# Patient Record
Sex: FEMALE | Race: WHITE | NOT HISPANIC OR LATINO | URBAN - METROPOLITAN AREA
[De-identification: names, ages, dates, MRNs, and addresses within clinical notes are randomized per-mention and may not be internally consistent; named-entity substitution may affect disease eponyms.]

---

## 2023-06-01 ENCOUNTER — HOSPITAL ENCOUNTER (OUTPATIENT)
Dept: DATA CONVERSION | Facility: HOSPITAL | Age: 28
End: 2023-06-01
Attending: ORTHOPAEDIC SURGERY | Admitting: ORTHOPAEDIC SURGERY
Payer: COMMERCIAL

## 2023-06-01 DIAGNOSIS — S93.492S SPRAIN OF OTHER LIGAMENT OF LEFT ANKLE, SEQUELA: ICD-10-CM

## 2023-06-01 DIAGNOSIS — M25.872 OTHER SPECIFIED JOINT DISORDERS, LEFT ANKLE AND FOOT: ICD-10-CM

## 2023-06-01 DIAGNOSIS — Z79.82 LONG TERM (CURRENT) USE OF ASPIRIN: ICD-10-CM

## 2023-06-01 DIAGNOSIS — Z88.0 ALLERGY STATUS TO PENICILLIN: ICD-10-CM

## 2023-06-01 DIAGNOSIS — Q68.8 OTHER SPECIFIED CONGENITAL MUSCULOSKELETAL DEFORMITIES: ICD-10-CM

## 2023-06-01 DIAGNOSIS — M77.32 CALCANEAL SPUR, LEFT FOOT: ICD-10-CM

## 2023-06-01 DIAGNOSIS — M94.272 CHONDROMALACIA, LEFT ANKLE AND JOINTS OF LEFT FOOT: ICD-10-CM

## 2023-09-07 VITALS
HEART RATE: 57 BPM | RESPIRATION RATE: 16 BRPM | SYSTOLIC BLOOD PRESSURE: 122 MMHG | TEMPERATURE: 97.3 F | DIASTOLIC BLOOD PRESSURE: 84 MMHG

## 2023-09-30 NOTE — H&P
History of Present Illness:   Pregnant/Lactating:  ·  Are You Pregnant no   ·  Are You Currently Breastfeeding no     History Present Illness:  Reason for surgery: left ankle os trigonum syndrome,  ankle sprain   HPI:    Patient is a 27 year old female here for left subtalar joint/hindfoot endoscopy with extensive debridement, os trigonum excision, ankle arthroscopy with extensive  debridement, open lateral ligament repair with augmented brostrom-squires procedure. Patient report she is doing well. No complaints. No hx of dvt, nonsmoker, no birth control.     Allergies:        Allergies:  ·  penicillin : Rash    Home Medication Review:   Home Medications Reviewed: yes     Impression/Procedure:   ·  Impression and Planned Procedure: left subtalar joint/hindfoot endoscopy with extensive debridement, os trigonum excision, ankle arthroscopy with extensive debridement, open lateral ligament repair  with augmented brostrom-squires procedure.       ERAS (Enhanced Recovery After Surgery):  ·  ERAS Patient: no     Review of Systems:   Review of Systems:  Musculoskeletal: POSITIVE: Decreased ROM, Pain, Swelling, Stiffness, Weakness     All Other Systems: All other systems reviewed and  are negative       Vital Signs:  Temperature C: 36.3 degrees C   Temperature F: 97.3 degrees F   Heart Rate: 57 beats per minute   Respiratory Rate: 16 breath per minute   Blood Pressure Systolic: 122 mm/Hg   Blood Pressure Diastolic: 84 mm/Hg     Physical Exam by System:    Constitutional: Well developed, awake/alert/oriented  x3, no distress, alert and cooperative   Eyes: clear sclera   ENMT: mucous membranes moist, no apparent injury,  no lesions seen   Head/Neck: Neck supple, no apparent injury, trachea  midline,   Respiratory/Thorax: Patent airways, normal breath  sounds   Cardiovascular: equal pulses of the extremities   Gastrointestinal: Nondistended, soft, non-tender   Musculoskeletal: wiggles toes/fingers   Neurological: alert and  oriented x3   Psychological: Appropriate mood and behavior   Skin: Warm and dry, no lesions, no rashes     Consent:   COVID-19 Consent:  ·  COVID-19 Risk Consent Surgeon has reviewed key risks related to the risk of hermila COVID-19 and if they contract COVID-19 what the risks are.       Electronic Signatures:  Terence Tripp (PA (PHYSICIAN))  (Signed 01-Jun-2023 09:04)   Authored: History of Present Illness, Allergies, Home  Medication Review, Impression/Procedure, ERAS, Review of Systems, Physical Exam, Consent, Note Completion      Last Updated: 01-Jun-2023 09:04 by Terence Tripp (PA (PHYSICIAN))

## 2023-10-02 NOTE — OP NOTE
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  Left hindfoot/subtalar joint scope with excision steida process talus,  2. Left ankle scope with extensive debridement of tibiotalar spurs  3. Left ankle collateral ligament ATFL repair Brostrum-Mortensen augmented    Anesthesia: Fern Dior  Estimated Blood Loss: Per anesthesia  Findings: Intraoperative findings consistent with clinical and radiographic findings.   Specimens(s) Collected: no,     Complications: None  Tourniquet Times: Per anesthesia  Patient Returned To/Condition: returned to post operative recovery area with all vital signs stable and intact. Normal capillary perfusion noted to most distal aspect of surgical extremity.                                 Attestation:   Note Completion:  Attending Attestation I was present for the entire procedure    I am a: Resident/Fellow         Electronic Signatures:  Kevin Kate (DPM (Resident))  (Signed 01-Jun-2023 15:27)   Authored: Post-Operative Note, Chart Review, Note Completion  Lise Davis)  (Signed 01-Jun-2023 17:08)   Authored: Post-Operative Note, Chart Review, Note Completion   Co-Signer: Post-Operative Note, Chart Review, Note Completion      Last Updated: 01-Jun-2023 17:08 by Lise Davis)

## 2023-10-10 ENCOUNTER — TREATMENT (OUTPATIENT)
Dept: PHYSICAL THERAPY | Facility: CLINIC | Age: 28
End: 2023-10-10
Payer: COMMERCIAL

## 2023-10-10 DIAGNOSIS — G89.29 CHRONIC PAIN OF LEFT ANKLE: Primary | ICD-10-CM

## 2023-10-10 DIAGNOSIS — M25.672 STIFFNESS OF ANKLE JOINT, LEFT: ICD-10-CM

## 2023-10-10 DIAGNOSIS — R29.898 ANKLE WEAKNESS: ICD-10-CM

## 2023-10-10 DIAGNOSIS — M25.572 CHRONIC PAIN OF LEFT ANKLE: Primary | ICD-10-CM

## 2023-10-10 DIAGNOSIS — M25.472 ANKLE EFFUSION, LEFT: ICD-10-CM

## 2023-10-10 PROCEDURE — 97140 MANUAL THERAPY 1/> REGIONS: CPT | Mod: GP | Performed by: PHYSICAL THERAPIST

## 2023-10-10 PROCEDURE — 97110 THERAPEUTIC EXERCISES: CPT | Mod: GP | Performed by: PHYSICAL THERAPIST

## 2023-10-10 NOTE — PROGRESS NOTES
Physical Therapy  Physical Therapy Treatment Note    Patient Name: No Young  MRN: [id number]  Today's Date: 10/10/2023  Time Calculation  Start Time: 1130  Stop Time: 1215  Time Calculation (min): 45 min    Insurance:  Visit number: 20 of 30    General:  Reason for visit: rehab following L ankle scope  Referred by: Dr Davis    Assessment:     Patient presents today after returning from several week trip to [place] with ankle symptom aggravation and decrease strength about the left ankle with testing today.  She demonstrates a loss of her ankle eversion strength with concordant symptoms, as well as general hypermobility of the left ankle.  We issued some ankle eversion strengthening exercises as well as single-leg stance and a gradual resumption of her rehab and strengthening exercises including running while wearing her brace once her symptoms have subsided, which seem to have been aggravated by increased time in weightbearing and walking during her travel.  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  She would continue to benefit from skilled physical therapy for increasing strength, progression of running on land, as well as progressing plyometrics per patient tolerance.   Response to treatment: decreased pain, improved joint mobility/ROM, improved strength, improved endurance, improved tissue mobility and improved knowledge and understanding of condition.       Plan:    Patient to don a ankle brace while ambulating or standing for longer periods of time, and especially with exercise and running to provide increased stability around the ankle while he is acute symptoms of pain and inflammation are resolving.  We discussed gradually increasing activity since she is decreased her overall activity level over the last couple weeks.  We will gradually ramp back up into running strength  and change direction once symptoms have resolved.  Continue to progress with running, basic change of direction, motor control, plyometrics, and strength training.    Progress with POC, as tolerated.       Current Problem  1. Chronic pain of left ankle        2. Ankle weakness        3. Ankle effusion, left        4. Stiffness of ankle joint, left            Precautions: none      Subjective:     Patient reports:  Patient returns following several week trip to [place] where she was traveling throughout [place].  She notes did a lot of walking every day as well as traveling on the plane and buses throughout the country.  As the trip went on she became more sore with weightbearing and ambulation in the ankle.  Was not able to do her normal exercise progression while traveling abroad.  Notes with resuming her normal progression of exercises including strengthening and running has had increase in discomfort especially noted in the subtalar region posteriorly.  Notes increased soreness with starting exercise and following though seems to be better tolerated with performance after an active warm up.  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  Reports did several sets of jogging on the court today with symptoms at initiation and then with walking over to the clinic following.    Pain  4/10     Performing HEP?: Yes      Objective:     Ortho   Incision mobility in normal limits  Concordant symptoms noted with subtalar joint mobilization in an anterior posterior direction as well as with medial or lateral mobilization, to a lesser degree with medial calcaneal tilting, and more so with resisted ankle eversion on the left, which demonstrates weakness today  Ambulating without antalgia or asymmetry in athletic shoe  Demonstrates normal to hypermobility at the subtalar and talocrural joint as well as with calcaneal tilting on the  "left    Strength   Foot and Ankle:   (Key: \" P! \" Denotes Pain with Movement)   Foot and ankle strength was normal on the right side.   Foot dorsiflexion was 5/5 on left.   Foot plantar flexion was 5/5 on left.   Ankle eversion was 4-/5 on left with concordant symptoms reproduced  Ankle inversion was 4+/5 on left.       Treatment Performed:    Therapeutic Exercise:    30 min  Objective test and measures as described above  Green T band ankle eversion strengthening 3 sets to failure  Single-leg stance on left eyes open with perturbation, eyes closed, then eyes closed with perturbation  Single-leg heel raises 3 sets to failure on left  Discussion of home exercise program and training progression including use of brace with ambulation and longer periods of standing, and especially with strength and running training at this time    Manual Therapy:    15 min  Gentle incision mobility  General foot and ankle passive range of motion mobilization  Talocrural, subtalar, calcaneal tilting grades 3-4 - in an anterior to posterior and posterior to anterior as well as medial and lateral direction for pain management    Ross Vega, PT   "

## 2023-10-17 ENCOUNTER — TREATMENT (OUTPATIENT)
Dept: PHYSICAL THERAPY | Facility: CLINIC | Age: 28
End: 2023-10-17
Payer: COMMERCIAL

## 2023-10-17 DIAGNOSIS — G89.29 CHRONIC PAIN OF LEFT ANKLE: ICD-10-CM

## 2023-10-17 DIAGNOSIS — M25.572 CHRONIC PAIN OF LEFT ANKLE: ICD-10-CM

## 2023-10-17 DIAGNOSIS — M25.472 ANKLE EFFUSION, LEFT: ICD-10-CM

## 2023-10-17 DIAGNOSIS — R29.898 ANKLE WEAKNESS: Primary | ICD-10-CM

## 2023-10-17 DIAGNOSIS — M25.672 STIFFNESS OF ANKLE JOINT, LEFT: ICD-10-CM

## 2023-10-17 PROCEDURE — 97110 THERAPEUTIC EXERCISES: CPT | Mod: GP | Performed by: PHYSICAL THERAPIST

## 2023-10-17 NOTE — PROGRESS NOTES
Physical Therapy  Physical Therapy Treatment Note    Patient Name: No Young  MRN: [id number]  Today's Date: 10/17/2023  Time Calculation  Start Time: 1100  Stop Time: 1145  Time Calculation (min): 45 min    Insurance:  Visit number: 21 of 30    General:  Reason for visit: rehab following L ankle scope  Referred by: Dr Davis    Assessment:     Patient doing much better today than 1 week ago.  She is having considerable less pain with her activity and exercise including running lifting and shooting.  Overall her exercises are going much better.  I was very pleased with her increase in strength activity tolerance, as well as performance of active warm up drill.  She would continue to benefit from skilled physical therapy for increasing strength, progression of running on land, as well as progressing plyometrics per patient tolerance.   Response to treatment: decreased pain, improved joint mobility/ROM, improved strength, improved endurance, improved tissue mobility and improved knowledge and understanding of condition.       Plan:    Patient to don a ankle brace while ambulating or standing for longer periods of time, and especially with exercise and running to provide increased stability around the ankle while he is acute symptoms of pain and inflammation are resolving.  We discussed continue to gradually increase activity since she is decreased her overall activity level over the last couple weeks.  We will gradually ramp back up into running strength and change direction once symptoms have resolved.  Continue to progress with running, basic change of direction, motor control, plyometrics, and strength training.    Progress with POC, as tolerated.       Current Problem  1. Ankle weakness        2. Chronic pain of left ankle        3. Stiffness of ankle joint, left        4. Ankle effusion, left            Precautions: none      Subjective:     Patient reports:  Patient returns noting she is feeling much  "better than a week ago.  She has been practicing her ankle strengthening exercises more regularly, and has resumed her regular training program gradually.  She notes there is not much of a difference at this point with performing activity in or out of the brace.  Overall much less pinching and discomfort with her ADLs and exercise, as well as running and shooting basketball.    Pain  1/10     Performing HEP?: Yes      Objective:     Ortho   Incision mobility in normal limits  11cm FDF bilat  Ambulating without antalgia or asymmetry in athletic shoe  No symptoms with general foot or ankle mobilization today  Performs active warm up on court as well as lateral motions and plyometrics including bounding and skater jumps laterally with appropriate control symmetry and without pain      Strength   Foot and Ankle:   (Key: \" P! \" Denotes Pain with Movement)   Foot and ankle strength was normal on the right side.   Foot dorsiflexion was 5/5 on left.   Foot plantar flexion was 5/5 on left.   Ankle eversion was 4/5 on left with concordant symptoms reproduced  Ankle inversion was 5/5 on left.       Treatment Performed:    Therapeutic Exercise:    45 min  Objective test and measures as described above  Bike x5 minutes  Prolonged dorsiflexion stretching on slant board times several minutes  Functional dorsiflexion on wall x30 each  Blue T band ankle eversion strengthening 3 sets to failure  Single-leg stance with toss and catch with trampoline 4 pound ball anterior right and left laterally x2 each  Eccentric resistance into dorsiflexion as well as ankle eversion 2 sets of 15 each  [place] board taps anterior posterior, medial lateral, clockwise counterclockwise x10 each  With weightbearing on left lower extremity on edge of wood board to resist ankle inversion moment, performs slide board anterior posterior, medial lateral, and an arc x10 each  Active warm up on court down back at 30 yards distance jogging anterior, running " backwards, shuffle, skipping, karaoke, skipping for height, bounding, zigzag bounding, high knees, butt kicks, lateral a skip, skater hops  Discussion of home exercise program and training progression including use of brace with ambulation and longer periods of standing, and especially with strength and running training at this time      Ross Vega, PT

## 2023-10-18 ENCOUNTER — APPOINTMENT (OUTPATIENT)
Dept: PHYSICAL THERAPY | Facility: CLINIC | Age: 28
End: 2023-10-18
Payer: COMMERCIAL

## 2023-10-21 PROBLEM — S93.492S: Status: ACTIVE | Noted: 2023-10-21

## 2023-10-21 PROBLEM — M25.872 IMPINGEMENT OF LEFT ANKLE JOINT: Status: ACTIVE | Noted: 2023-10-21

## 2023-10-21 PROBLEM — K12.0 RECURRENT APHTHOUS ULCER: Status: ACTIVE | Noted: 2020-08-25

## 2023-10-21 PROBLEM — Q68.8 OS TRIGONUM SYNDROME: Status: ACTIVE | Noted: 2023-10-21

## 2023-10-21 RX ORDER — OXYMETAZOLINE HCL 0.05 %
2 SPRAY, NON-AEROSOL (ML) NASAL 2 TIMES DAILY
COMMUNITY
Start: 2021-06-10

## 2023-10-21 RX ORDER — OXYCODONE AND ACETAMINOPHEN 5; 325 MG/1; MG/1
TABLET ORAL
COMMUNITY
Start: 2023-06-01

## 2023-10-21 RX ORDER — AZITHROMYCIN 250 MG/1
TABLET, FILM COATED ORAL
COMMUNITY
Start: 2023-09-05

## 2023-10-21 RX ORDER — DOCUSATE SODIUM 100 MG/1
100 CAPSULE, LIQUID FILLED ORAL
COMMUNITY
Start: 2023-06-01

## 2023-10-21 RX ORDER — ONDANSETRON 4 MG/1
TABLET, FILM COATED ORAL
COMMUNITY
Start: 2023-06-01

## 2023-10-21 RX ORDER — ASPIRIN 325 MG
325 TABLET, DELAYED RELEASE (ENTERIC COATED) ORAL
COMMUNITY
Start: 2023-06-02

## 2023-10-24 ENCOUNTER — TREATMENT (OUTPATIENT)
Dept: PHYSICAL THERAPY | Facility: CLINIC | Age: 28
End: 2023-10-24
Payer: COMMERCIAL

## 2023-10-24 ENCOUNTER — OFFICE VISIT (OUTPATIENT)
Dept: ORTHOPEDIC SURGERY | Facility: CLINIC | Age: 28
End: 2023-10-24
Payer: COMMERCIAL

## 2023-10-24 DIAGNOSIS — M25.472 ANKLE EFFUSION, LEFT: ICD-10-CM

## 2023-10-24 DIAGNOSIS — M25.572 CHRONIC PAIN OF LEFT ANKLE: ICD-10-CM

## 2023-10-24 DIAGNOSIS — M25.872 IMPINGEMENT OF LEFT ANKLE JOINT: ICD-10-CM

## 2023-10-24 DIAGNOSIS — S93.492S SPRAIN OF ANTERIOR TALOFIBULAR LIGAMENT, LEFT, SEQUELA: ICD-10-CM

## 2023-10-24 DIAGNOSIS — M25.672 STIFFNESS OF ANKLE JOINT, LEFT: ICD-10-CM

## 2023-10-24 DIAGNOSIS — G89.29 CHRONIC PAIN OF LEFT ANKLE: ICD-10-CM

## 2023-10-24 DIAGNOSIS — R29.898 ANKLE WEAKNESS: Primary | ICD-10-CM

## 2023-10-24 DIAGNOSIS — Q68.8 OS TRIGONUM SYNDROME: Primary | ICD-10-CM

## 2023-10-24 PROCEDURE — 99213 OFFICE O/P EST LOW 20 MIN: CPT | Performed by: ORTHOPAEDIC SURGERY

## 2023-10-24 PROCEDURE — 97110 THERAPEUTIC EXERCISES: CPT | Mod: GP | Performed by: PHYSICAL THERAPIST

## 2023-10-24 NOTE — PROGRESS NOTES
Physical Therapy  Physical Therapy Treatment Note    Patient Name: No Young  MRN: [id number]  Today's Date: 10/24/2023  Time Calculation  Start Time: 1100  Stop Time: 1145  Time Calculation (min): 45 min    Insurance:  Visit number: 22 of 30    General:  Reason for visit: rehab following L ankle scope  Referred by: Dr Davis    Assessment:     Patient continues to demonstrate progress with increased ankle stability and decreased pain.  She is still having intermittent discomfort though it continues to improve.  At this point we really need to progress with plyometrics, change direction, and progressive basketball type activities in addition to general strengthening at the ankle.  She had a follow-up visit with Dr. Davis who continues to endorse this as well.  She would continue to benefit from skilled physical therapy for increasing strength, progression of running on land, as well as progressing plyometrics per patient tolerance.   Response to treatment: decreased pain, improved joint mobility/ROM, improved strength, improved endurance, improved tissue mobility and improved knowledge and understanding of condition.       Plan:    Patient will continue to implement plyometrics 2 times a week as well as change of direction and basketball specific drills.  She will work on her strength and single-leg balance and stability.  At this point we will plan to follow-up again in 2 weeks.  Continue to progress with running, basic change of direction, motor control, plyometrics, and strength training.    Progress with POC, as tolerated.       Current Problem  1. Ankle weakness        2. Chronic pain of left ankle        3. Stiffness of ankle joint, left        4. Ankle effusion, left            Precautions: none      Subjective:     Patient reports:  Patient returns noting she is feeling much better than a week ago.  She has been practicing her ankle strengthening exercises more regularly, and has resumed her  "regular training program gradually.  She reports with increased basketball activity and change direction that she has less symptoms while wearing her brace.  Overall much less pinching and discomfort with her ADLs and exercise, as well as running and shooting basketball.    Pain  1/10     Performing HEP?: Yes      Objective:     Ortho   Incision mobility in normal limits  11cm FDF bilat  Ambulating without antalgia or asymmetry in athletic shoe  No symptoms with general foot or ankle mobilization today  With single-leg stance and proprioception exercises starts to demonstrate increased fatigue and achiness with performance on the lateral left ankle.  Able to perform lateral medial hopping on the left ankle with appropriate control and without pain.      Strength   Foot and Ankle:   (Key: \" P! \" Denotes Pain with Movement)   Foot and ankle strength was normal on the right side.   Foot dorsiflexion was 5/5 on left.   Foot plantar flexion was 5/5 on left.   Ankle eversion was 4+/5 on left with concordant symptoms reproduced  Ankle inversion was 5/5 on left.       Treatment Performed:    Therapeutic Exercise:    45 min  Objective test and measures as described above  Prolonged dorsiflexion stretching on slant board times several minutes  Functional dorsiflexion on wall x30 each  Eccentric resistance into ankle eversion and inversion 3 sets of 15 each  [place] board taps anterior posterior, medial lateral, clockwise counterclockwise x10 each  With weightbearing on left lower extremity on edge of wood board to resist ankle inversion moment, performs slide board anterior posterior, medial lateral, and an arc x10 each  Plyometrics on turf including double leg jumps forward for repetition and then for distance, bounding, single-leg hopping anterior and laterally, skater jumps, 4 corner jumps clockwise and counterclockwise  Discussion of home exercise program and training progression including continued use of brace for " basketball and change direction type activities, continued strengthening, proprioception, balance, change of direction, as well as plyometric exercises to be implemented twice a week      Ross Vega, PT

## 2023-10-24 NOTE — PROGRESS NOTES
She reports that she is improving.  She is doing PT- doing individual basketball drills and progressed to land running, weight training, jumping. Only minimal soreness over the lateral ankle after therapy/workouts     On examination of left ankle, incisions healed, neg anterior drawer and talar tilt, full ROM of ankle and ST joint, strength 5/5 INV/EV/DF/PF, calf girth improved, able to do single leg squat with good control. Good balance. Able to do single leg heel raise    A/P: s/p Left hindfoot/subtalar joint scope with excision steida process talus, Left ankle scope with extensive debridement of tibiotalar spurs, Left ankle collateral ligament ATFL repair Luis Angelstodilia-Mortensen augmented 6/1/23  - continue PT, updated Rx  - increase cardio training, progressing well  - fuv in 2 months prior to her returning abroad or prn    I personally saw and evaluated the patient. I personally obtained the key and critical portions of the history and physical exam or was physically present for key and critical portions performed by the Terence ARAGON-DEBBIE. I reviewed the PA-C 's documentation and discussed the patient with the PA-C. I agree with the PA-C's medical decision making as documented in the note.    Lise Davis MD

## 2024-05-06 NOTE — OP NOTE
PREOPERATIVE DIAGNOSIS:  1. Left painful posterior Stieda process.  2. Left ankle impingement anterior with spur formation.  3. Anterior talofibular ligament tear sequelae.    POSTOPERATIVE DIAGNOSIS:  1. Left painful posterior Stieda process.  2. Left ankle impingement anterior with spur formation.  3. Anterior talofibular ligament tear sequelae.    OPERATION/PROCEDURE:  1. Left hindfoot endoscopy/subtalar joint arthroscopy with extensive  debridement and excision of Stieda process of talus.   2. Left ankle arthroscopy with extensive debridement of impingement  spur on the tibia and talus.   3. Left ankle open repair of lateral collateral ligament with  modified Brostrom Mortensen with InternalBrace (Arthrex Arthro  InternalBrace implant system).     SURGEON:  Lise Davis MD.    ASSISTANT(S):  Dr. Kevin Kate.    ANESTHESIA:  General with popliteal block.    FLUIDS:  Crystalloid.    ESTIMATED BLOOD LOSS:  Less than 5 cc.    DISPOSITION:  The patient to PACU in stable condition.    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  She had  tried conservative treatment for her conditions, but unfortunately  had significant pain and functional limitations.  She also had pain  in the front of the ankle related to anterior ankle impingement.  She  also has issues with ankle rolling easily and instability.  We  discussed with the patient alternatives of treatment in detail.  After a long discussion, the patient selected surgical repair and  reconstruction.  We discussed with her the risks of surgery including  pain, bleeding,  infection, wound healing problems, soft tissue  healing problems, bone healing problems, hardware related  complications, injury to nerves or vessels, DVT, PE, chronic ankle  pain, stiffness or swelling, and other medical complications.  Once  the patient's questions regarding the procedure, the postoperative  course requiring nonweightbearing initially, and the extensive  rehabilitation needed after surgery were answered to her  satisfaction, she then gave informed consent for the procedure.     DESCRIPTION OF PROCEDURE:  The patient was identified in the preoperative holding area.  The  left ankle was marked with a skin marker to designate the surgical  site along with the hindfoot.  The patient received popliteal block  per Anesthesia consult without complication.  The patient was brought  back to the operating room.  A Huddle was called confirming the  operating as per protocol.  Time-out also was called confirmed prior  to skin incision.  The patient did receive IV Ancef.  DVT prophylaxis  was performed with SCD stocking on the normal right leg during the  case.  Once the general anesthetic was administered and intubation  proceeded without difficulty, we then placed a well-padded tourniquet  on the left upper thigh.  During the procedure, the tourniquet was  elevated for 60 minutes at 380 mmHg for the 1st portion of the case  where the patient was prone and then the tourniquet was down for  repositioning into supine on a beanbag and then tourniquet was  elevated for additional 71 minutes.  We carefully placed the patient  then prone on the operating room table on thoracic chest rolls and  appropriate padding for all extremities.  Facial pillow was utilized  with near combination to make sure facial structures were  appropriately protected.  Neutral spinal alignment maintained.  Once  we had the patient positioned prone appropriately, we then proceeded  to prep and drape the left lower extremity in the usual  standard  sterile fashion.  Prone position was used for the hindfoot endoscopy  and subtalar joint arthroscopy portion of procedure.  After sterile  prepping and draping, we then proceeded with the subtalar joint  arthroscopy and hindfoot endoscopy with extensive debridement.  We  marked the plantar aspect of the 1st web space.  We then noted the  lateral border of the Achilles tendon and staying right on the  lateral border of the Achilles tendon, we utilized an 18-gauge spinal  needle that we entered into the hindfoot and subtalar joint just  superior to the horizontal dotted line from the distal tip of the  fibula intersecting with the Achilles.  Aiming towards the 1st web  space, we inserted the 18-gauge spinal needle to localize our  posterolateral portal.  Once we confirmed good position, we then made  a small nick in the skin and created the portal with a blunt  hemostat.  We then created a posteromedial portal by utilizing  triangulation technique.  We stayed just on the medial border of the  Achilles tendon.  Needle trajectory was anterior to the tendon that  would hit the scope and then slide down for triangulation.  This was  done to prevent any neurovascular injury.  The 2.7, 30-degree  arthroscope had been placed in the posterolateral portal as we  localized our posteromedial portal.  Once we had good positioning, we  then made a small nick in the skin and then created a posteromedial  portal with a blunt hemostat.  There was extensive scar tissue and a  thick crural fascia.  The area was extremely thick with scar tissue  and so we utilized oscillating shaver 1st and then utilized  ArthroCare wand for debridement.  We were able to see the large  posterior process of the talus that was partially loose hanging over  the posterior aspect of the calcaneus with secondary formation of  osteophytes on the calcaneal side of the subtalar joint.  We  carefully debrided and localized and located the FHL  tendon,  confirming more than 1 cm of excursion with movement of the great  toe.  We did not go any more medial to the FHL.  We then proceeded  with copious scar tissue removal.  We also utilized the ArthroCare  wand.  We then cleaned up the borders of the enlarged posterior  process of the talus.  There was also a loose body in the posterior  hindfoot that we removed with a grasper.  Once we were able to  outline a very large Stieda process, we then proceeded to release the  flexor retinaculum medially from it and also released a portion of  the posterior talofibular ligament from it also and the  intermalleolar ligament complex also was elevated off that process.  Once we had good visualization, we then proceeded with use of bur to  remove the large process.  We switched portals prior to this.  We  then utilized a grasper and punch to remove the shell of material  remaining.  After we debrided, we did get a bur also and removed the  osteophytes along the calcaneus posteromedially and posterolaterally.   The cartilage surface fortunately was only mild chondromalacia of  posterior facet, grade 2 in 1 small area and the rest of the joint  looked good on arthroscopy of the subtalar joint.  There was  synovitis and plical folds that were also debrided in the subtalar  joint with the oscillating shaver.  We then utilized mini C-arm  fluoroscopy to get a good lateral view of the ankle and hindfoot.  This confirmed appropriate excision and removal of the elongated  posterior process of the talus that was causing impingement.  We then  copiously irrigated.  We then removed our arthroscopic equipment.  We  then closed the portals with 4-0 nylon suture.  We then placed a  sterile Tegaderm over the incisions.  Tourniquet was released.  Toes  were pink and warm with good capillary refill.  Distal pulses intact.   We then proceeded to carefully remove the drapes.  We then carefully  brought in the bed and carefully utilizing a  roller placed the  patient supine on the hospital bed.  We then placed a beanbag on the  surgical operative bed.  We then carefully transferred the patient  onto the operative bed.  We then utilized the beanbag to place a bump  underneath the left hip to help with access to the left ankle during  the 2nd portion of the case.  We then utilized the Ferkel leg lang  and applied appropriately to protect the neurovascular structures.  We then re-prepped and draped the left lower extremity in usual  standard sterile fashion.  We then began the left ankle arthroscopy  with extensive debridement and a left open repair of lateral  collateral ligament, the ATFL augmented with InternalBrace and  Brostrom-Mortensen repair.  We first did the arthroscopy.  We applied the  noninvasive ankle distractor system to the left ankle.  We then  localized the anteromedial portal with the 18-gauge spinal needle.  We then made a small nick in the skin.  We then created the portal  with a blunt hemostat.  We then introduced a 2.7, 30-degree  arthroscope into the ankle joint looking anterolaterally.  We then  localized our anterolateral portal with the 18-gauge spinal needle.  We then made a small nick in the skin and created the portal with a  blunt hemostat.  There was extensive synovitis and plical thickening  in the anterior compartment of the ankle extending into the medial  gutter and the lateral gutter.  This was removed with the oscillating  shaver.  We then utilized the ArthroCare wand for careful hemostasis.   There was a large syndesmotic meniscoid lesion also that we had to  remove.  The AITFL and PITFL ligaments were intact and normal  appearing.  There was some mild chondromalacia of the central aspect  of the tibia.  The talus was thoroughly probed and intact except  there was a spur on the tibia that was abrading the talar cartilage  on the talar dome centrally with grade 2 changes, however, no  full-thickness cartilage defects.   There was also an impaction injury  noted at the junction between the tibial plafond and the medial  malleolus.  We gently removed the chondral flaps in that area, grade  2 and 3 changes localized.  There was a spur on the tibia centrally  that was removed with a bur and grasper was used to remove a bone  fragment due to the spur.  We then utilized oscillating shaver to  remove a tiny spur that was located on the dorsal medial talus.  Once  we completed debridement, full dorsiflexion with no soft tissue or  bony impingement was confirmed with the scope in the ankle and the  traction released.  We then copiously irrigated and removed our  arthroscopic equipment.  We then closed the portals with 4-0 nylon.  We then turned our attention to the lateral ligament repair.  We  utilized a portion of her previous incisions from her peroneal  tenodesis.  A curvilinear incision was made with a 15 blade.  We then  utilized bipolar cautery for hemostasis.  We identified the inferior  extensor retinaculum and released it and tagged it for later repair.  We then identified the ATFL which was present, but hypertrophic and  significant scar.  We utilized InternalBrace technique.  The patient  had metallic anchors in the distal fibula and so we utilized a  Brostrom type repair utilizing a SwiveLock anchor with suture and the  InternalBrace in the talus.  We localized both the fibular footprint  for the ATFL and also the talar footprint of the ATFL.  We then  utilized mini C-arm fluoroscopy and guide pin and localized the  appropriate start point for the SwiveLock anchor just at the junction  of the anterior most portion of the lateral process of the talus in  the shoulder region there.  Once we had good position, confirmed by  x-ray with perfect lateral and 45-degree angle utilized with respect  to the tibia and an appropriate angle into the talus, we then drilled  the guide pin under fluoroscopic imaging.  Once we had good  position,  we then over drilled, tapped, and then placed a SwiveLock anchor.  [place] was secured.  We then utilized #2 FiberWire and did a  pants-over-vest repair of the ATFL.  We then reinforced the repair  utilizing the InternalBrace shuttling 1 limb of the InternalBrace  suture through the ligament and then tying at the talar side to  advance and tighten the ligament repair.  The fibula with all the  metallic suture anchors was not available with respect to placement  of another SwiveLock anchor.  We got a nice repair.  We tied down.  Anterior drawer testing was negative, talar tilt testing was  negative.  Full range of motion of the ankle noted.  We then trimmed  the sutures.  We then proceeded with copious irrigation.  We then  proceeded to repair the inferior extensor retinaculum with  interrupted 2-0 Vicryl sutures mattress type.  We then copiously  irrigated and we then closed the deep tissue with 2-0 Vicryl sutures.   We then closed subdermal tissue with 4-0 Vicryl sutures.  Finally,  the skin was closed with 4-0 nylon.  Tourniquet was released.  All  toes were pink and warm with good capillary refill.  Distal pulses  were intact.  We then placed dressing including Xeroform, fluffs,  ABD, soft roll, cast padding.  We then placed the patient in a very  well-padded posterior sugar-tong splint.  We then completed the  dressing with multiple Ace wraps from the foot to the upper calf.  The patient tolerated the procedure well.  She was extubated and  transported to the PACU in stable condition.  Discharge criteria met  and the patient will follow up with us in the office in 10 to 14  days.  She was given detailed discharge instructions and home going  medications including Percocet, Colace, Zofran, and enteric-coated  aspirin to be started on postop day #1 for DVT prophylaxis.  Findings  were discussed with the patient and her family after surgery as per  request.       Lise Davis MD    DD:   06/01/2023 15:56:53 EST  DT:  06/01/2023 21:36:08 EST  DICTATION NUMBER:  [id number]  INTERNAL JOB NUMBER:  [id number]    CC:  Lise Davis MD, Fax: [phone]        Electronic Signatures:  Lise Davis (MD) (Signed on 02-Jun-2023 22:18)   Authored  Unsigned, Draft (SYS GENERATED) (Entered on 01-Jun-2023 21:36)   Entered    Last Updated: 02-Jun-2023 22:18 by Lise Davis)